# Patient Record
Sex: MALE | URBAN - METROPOLITAN AREA
[De-identification: names, ages, dates, MRNs, and addresses within clinical notes are randomized per-mention and may not be internally consistent; named-entity substitution may affect disease eponyms.]

---

## 2021-05-10 ENCOUNTER — IMPORTED ENCOUNTER (OUTPATIENT)
Dept: URBAN - METROPOLITAN AREA CLINIC 9 | Facility: CLINIC | Age: 56
End: 2021-05-10

## 2021-05-24 ENCOUNTER — IMPORTED ENCOUNTER (OUTPATIENT)
Dept: URBAN - METROPOLITAN AREA CLINIC 9 | Facility: CLINIC | Age: 56
End: 2021-05-24

## 2021-09-10 NOTE — PATIENT DISCUSSION
Recommend OBSERVATION and continued MONITORING for progression. Remote pacemaker transmission received and reviewed.  Device transmission sent per MD orders.  Patient has a [employer] dual lead pacemaker.  Normal pacemaker function.  1 AT/AF episode recorded - 3 sec.  Presenting EGM = AS- @ 82 bpm.  AP = 39.4%.   = 100%.  Estimated battery longevity to JYOTI = 6.5 years.  Patient notified of interrogation results via voicemail.  Plan for patient to return to clinic in 3 months as scheduled.    Remote pacemaker transmission

## 2021-10-16 ASSESSMENT — VISUAL ACUITY
OS_CC: 20/30 SN
OD_CC: 20/20 - SN
OD_CC: 20/30 + SN
OS_CC: 20/20 - SN

## 2021-10-16 ASSESSMENT — KERATOMETRY
OS_K2POWER_DIOPTERS: 42
OD_AXISANGLE2_DEGREES: 111
OD_AXISANGLE_DEGREES: 21
OS_AXISANGLE_DEGREES: 169
OD_K2POWER_DIOPTERS: 42.25
OD_K1POWER_DIOPTERS: 41.5
OS_AXISANGLE2_DEGREES: 79
OS_K1POWER_DIOPTERS: 41.75

## 2021-10-16 ASSESSMENT — TONOMETRY
OD_IOP_MMHG: 11
OS_IOP_MMHG: 11

## 2022-06-19 RX ORDER — LISINOPRIL 20 MG/1
TABLET ORAL
COMMUNITY

## 2022-06-19 RX ORDER — LEVOTHYROXINE SODIUM 0.12 MG/1
TABLET ORAL
COMMUNITY

## 2022-06-19 RX ORDER — ROSUVASTATIN CALCIUM 10 MG/1
TABLET, COATED ORAL
COMMUNITY

## 2022-06-19 RX ORDER — AMLODIPINE BESYLATE 5 MG/1
TABLET ORAL
COMMUNITY

## 2022-06-19 RX ORDER — PANTOPRAZOLE SODIUM 40 MG/1
TABLET, DELAYED RELEASE ORAL
COMMUNITY

## 2022-06-19 RX ORDER — BUPROPION HYDROCHLORIDE 150 MG/1
TABLET, EXTENDED RELEASE ORAL
COMMUNITY
Start: 2022-01-11

## 2022-06-19 RX ORDER — HYDROCHLOROTHIAZIDE 12.5 MG/1
CAPSULE, GELATIN COATED ORAL
COMMUNITY

## 2022-08-22 PROBLEM — M23.329 DERANGEMENT OF POSTERIOR HORN OF MEDIAL MENISCUS: Status: ACTIVE | Noted: 2022-08-22

## 2022-08-22 PROBLEM — M22.40 CHONDROMALACIA OF PATELLA: Status: ACTIVE | Noted: 2022-08-22

## 2022-08-22 PROBLEM — I10 ESSENTIAL HYPERTENSION: Status: ACTIVE | Noted: 2022-08-22

## 2022-08-22 PROBLEM — G47.19 DAYTIME HYPERSOMNOLENCE: Status: ACTIVE | Noted: 2022-08-22

## 2022-08-22 PROBLEM — E03.9 ACQUIRED HYPOTHYROIDISM: Status: ACTIVE | Noted: 2022-08-22

## 2022-08-22 PROBLEM — F32.4 MAJOR DEPRESSION SINGLE EPISODE, IN PARTIAL REMISSION (HCC): Status: ACTIVE | Noted: 2022-08-22

## 2022-08-22 PROBLEM — M77.10 LATERAL EPICONDYLITIS: Status: ACTIVE | Noted: 2022-08-22

## 2022-08-22 PROBLEM — M17.31 POST-TRAUMATIC OSTEOARTHRITIS OF RIGHT KNEE: Status: ACTIVE | Noted: 2022-08-22

## 2022-08-22 PROBLEM — E78.2 MIXED HYPERLIPIDEMIA: Status: ACTIVE | Noted: 2022-08-22
